# Patient Record
Sex: FEMALE | Race: WHITE | NOT HISPANIC OR LATINO | URBAN - METROPOLITAN AREA
[De-identification: names, ages, dates, MRNs, and addresses within clinical notes are randomized per-mention and may not be internally consistent; named-entity substitution may affect disease eponyms.]

---

## 2022-07-31 ENCOUNTER — TELEMEDICINE (OUTPATIENT)
Dept: FAMILY MEDICINE CLINIC | Facility: TELEHEALTH | Age: 52
End: 2022-07-31

## 2022-07-31 DIAGNOSIS — S80.819D: Primary | ICD-10-CM
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
## 2022-07-31 PROCEDURE — 99212 OFFICE O/P EST SF 10 MIN: CPT | Performed by: NURSE PRACTITIONER

## 2022-08-01 ENCOUNTER — TELEMEDICINE (OUTPATIENT)
Dept: FAMILY MEDICINE CLINIC | Facility: TELEHEALTH | Age: 52
End: 2022-08-01
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
## 2022-08-01 PROCEDURE — 99213 OFFICE O/P EST LOW 20 MIN: CPT | Performed by: NURSE PRACTITIONER

## 2022-08-01 RX ORDER — AZITHROMYCIN 250 MG/1
TABLET, FILM COATED ORAL
Qty: 6 TABLET | Refills: 0 | Status: SHIPPED | OUTPATIENT
Start: 2022-08-01

## 2022-08-01 NOTE — PROGRESS NOTES
You have chosen to receive care through a telehealth visit.  Do you consent to use a video/audio connection for your medical care today? Yes     CHIEF COMPLAINT  Chief Complaint   Patient presents with   • cat scratch         HPI  Risa Alvarez is a 52 y.o. female  presents with complaint of cat scratch to bilateral lower ext. Reports she was involved in trying to break 2 cats up that were fighting. Reports she took her last dose of Doxy this evening. Reports the redness has improved but she is wondering if she needed another antibiotic. Denies any fever or chills. No nausea or vomiting. Denies any red streaking. Denies any warmth. Reports she has not taken any other medications for her symptoms.     Review of Systems   Constitutional: Negative for chills, fatigue and fever.   HENT: Negative for congestion, ear discharge, ear pain, sinus pressure, sinus pain and sore throat.    Respiratory: Negative for cough, chest tightness, shortness of breath and wheezing.    Cardiovascular: Negative for chest pain.   Gastrointestinal: Negative for abdominal pain, diarrhea, nausea and vomiting.   Musculoskeletal: Negative for back pain and myalgias.   Skin:        Cat scratches to bilateral legs    Neurological: Negative for dizziness and headaches.   Psychiatric/Behavioral: Negative.        History reviewed. No pertinent past medical history.    History reviewed. No pertinent family history.    Social History     Socioeconomic History   • Marital status:    Tobacco Use   • Smoking status: Never Smoker   • Smokeless tobacco: Never Used   Vaping Use   • Vaping Use: Never used   Substance and Sexual Activity   • Alcohol use: Not Currently   • Drug use: Defer   • Sexual activity: Defer       Risa Alvarez  reports that she has never smoked. She has never used smokeless tobacco.. I have educated her on the risk of diseases from using tobacco products such as cancer, COPD and heart disease.       I spent 1 minutes  counseling the patient.              Breastfeeding No     PHYSICAL EXAM  Physical Exam   Constitutional: She is oriented to person, place, and time. She appears well-developed and well-nourished. No distress.   HENT:   Head: Normocephalic and atraumatic.   Right Ear: Hearing normal.   Left Ear: Hearing normal.   Nose: No congestion. Right sinus exhibits no maxillary sinus tenderness. Left sinus exhibits no maxillary sinus tenderness.   Mouth/Throat: Mouth/Lips are normal.  Eyes: Conjunctivae and lids are normal.   Pulmonary/Chest: Effort normal.  No respiratory distress.  Lymphadenopathy:        Right cervical: No anterior cervical adenopathy present.       Left cervical: No anterior cervical adenopathy present.   No enlarged lymph nodes noted.    Neurological: She is alert and oriented to person, place, and time. She has normal strength.   Skin:   Note 2 small scratches to the right lower leg  Note golf ball sized circumference area of redness. No warmth. No red streaking from the site. Mildly tender. Noticed a  Small firm area where the claw puncture was. No fluctuance. No drainage noted.    Psychiatric: She has a normal mood and affect. Her speech is normal and behavior is normal.       No results found for this or any previous visit.    Diagnoses and all orders for this visit:    1. Cat scratch of lower leg, subsequent encounter (Primary)    warm compresses   Reports she took last dose of doxy this evening   Go to ER if symptoms worsen such as high fever, nausea vomiting, red streaking, drainage, increased redness or pain.       FOLLOW-UP  As discussed during visit with PCP/[facility] if no improvement or Urgent Care/Emergency Department if worsening of symptoms    Patient verbalizes understanding of medication dosage, comfort measures, instructions for treatment and follow-up.    JARON Rose  07/31/2022  23:13 EDT    The use of a video visit has been reviewed with the patient and verbal informed  consent has been obtained. Myself and Risa Alvarez participated in this visit. The patient is located in [address] [place] [address].    I am located in [place], [place]. Mychart and Zoom were utilized. I spent 5 minutes in the patient's chart for this visit.

## 2022-08-02 ENCOUNTER — TELEPHONE (OUTPATIENT)
Dept: FAMILY MEDICINE CLINIC | Facility: TELEHEALTH | Age: 52
End: 2022-08-02

## 2022-08-02 NOTE — PROGRESS NOTES
You have chosen to receive care through a telehealth visit.  Do you consent to use a video/audio connection for your medical care today? Yes     CHIEF COMPLAINT  No chief complaint on file.        HPI  Risa Alvarez is a 52 y.o. female  presents with complaint of cat scratch on 7-25-22. Reports she was seen originally at . Reports she was seen by virtual and instructed to go to Urgent care to check for  lymphadenopathy and wound check. Reports she was unable to get to Urgent care before it closed tonight due to working. Reports the area has improved overnight. Denies any fever or chills. No nausea or vomiting. No headache. Reports she hasn't taken any medications for her symptoms.          Review of Systems   Constitutional: Negative for chills, fatigue and fever.   HENT: Negative for congestion, ear discharge, ear pain, sinus pressure, sinus pain and sore throat.    Respiratory: Negative for cough, chest tightness, shortness of breath and wheezing.    Cardiovascular: Negative for chest pain.   Gastrointestinal: Negative for abdominal pain, diarrhea, nausea and vomiting.   Musculoskeletal: Negative for back pain and myalgias.   Skin:        Both lower legs with cat scratches   Neurological: Negative for dizziness and headaches.   Psychiatric/Behavioral: Negative.        History reviewed. No pertinent past medical history.    History reviewed. No pertinent family history.    Social History     Socioeconomic History   • Marital status:    Tobacco Use   • Smoking status: Never Smoker   • Smokeless tobacco: Never Used   Vaping Use   • Vaping Use: Never used   Substance and Sexual Activity   • Alcohol use: Not Currently   • Drug use: Defer   • Sexual activity: Defer       Risa Alvarez  reports that she has never smoked. She has never used smokeless tobacco.. I have educated her on the risk of diseases from using tobacco products such as cancer, COPD and heart disease.         I spent 1 minutes  counseling the patient.              Breastfeeding No     PHYSICAL EXAM  Physical Exam   Constitutional: She is oriented to person, place, and time. She appears well-developed and well-nourished. No distress.   HENT:   Head: Normocephalic and atraumatic.   Right Ear: Hearing normal.   Left Ear: Hearing normal.   Mouth/Throat: Mouth/Lips are normal.  Eyes: Conjunctivae and lids are normal.   Pulmonary/Chest: Effort normal.  No respiratory distress.  Lymphadenopathy:        Right cervical: No anterior cervical adenopathy present.       Left cervical: No anterior cervical adenopathy present.   No enlarged lymph nodes to neck or axilla   Patient directed exam   Neurological: She is alert and oriented to person, place, and time. She has normal strength.   Skin:   2 scratches noted on right lower leg. No drainage. Mild redness. Left leg with golf ball sized area of redness. No swelling. Note small firm puncture. No abscess noted. No fluctuance noted. No drainage. No warmth per patient directed exam    Psychiatric: She has a normal mood and affect. Her speech is normal and behavior is normal.       No results found for this or any previous visit.    Diagnoses and all orders for this visit:    1. Cat scratch (Primary)    Other orders  -     azithromycin (Zithromax Z-Jacob) 250 MG tablet; Take 2 tablets by mouth on day 1, then 1 tablet daily on days 2-5  Dispense: 6 tablet; Refill: 0    will start zpack- patient allergic to PCN and sulfa  Advised patient to follow up with urgent care in 1-2 days for recheck in person. Patient agrees to follow up since doesn't have established PCP  Warm compresses  ER for worsening symptoms such as high fever, red streaking, pain at site or nausea vomiting      FOLLOW-UP  As discussed during visit with PCP/[facility] if no improvement or Urgent Care/Emergency Department if worsening of symptoms    Patient verbalizes understanding of medication dosage, comfort measures, instructions for  treatment and follow-up.    Erin Tatum Gisele, JARON  08/01/2022  20:12 EDT    The use of a video visit has been reviewed with the patient and verbal informed consent has been obtained. Myself and Risa Alvarez participated in this visit. The patient is located in [address] [place] [address].    I am located in [place], [place]. Mychart and Zoom were utilized. I spent 5 minutes in the patient's chart for this visit.